# Patient Record
Sex: FEMALE | Race: WHITE | Employment: UNEMPLOYED | URBAN - METROPOLITAN AREA
[De-identification: names, ages, dates, MRNs, and addresses within clinical notes are randomized per-mention and may not be internally consistent; named-entity substitution may affect disease eponyms.]

---

## 2019-02-07 ENCOUNTER — DOCUMENTATION ONLY (OUTPATIENT)
Dept: NEUROSURGERY | Age: 64
End: 2019-02-07

## 2019-02-07 DIAGNOSIS — I67.1 CEREBRAL ANEURYSM: Primary | ICD-10-CM

## 2019-02-12 ENCOUNTER — TELEPHONE (OUTPATIENT)
Dept: NEUROSURGERY | Age: 64
End: 2019-02-12

## 2019-02-12 DIAGNOSIS — I67.1 CEREBRAL ANEURYSM: Primary | ICD-10-CM

## 2019-02-12 NOTE — TELEPHONE ENCOUNTER
Spoke to provider and VO for MRA with Contrast noted. Spoke to Gerber @ [facility] and informed her of new order.

## 2019-02-12 NOTE — TELEPHONE ENCOUNTER
----- Message from [facility] CNA sent at 2/11/2019 12:42 PM EST -----  Reji Joe [phone]- patient states the order is to be MRA brain with contrast -  needs clarification before scheduling

## 2019-02-27 ENCOUNTER — HOSPITAL ENCOUNTER (OUTPATIENT)
Dept: MRI IMAGING | Age: 64
Discharge: HOME | End: 2019-02-27
Attending: RADIOLOGY
Payer: MEDICARE

## 2019-02-27 VITALS — WEIGHT: 283 LBS | BODY MASS INDEX: 37.34 KG/M2

## 2019-02-27 DIAGNOSIS — I67.1 CEREBRAL ANEURYSM: ICD-10-CM

## 2019-02-27 PROCEDURE — A9585 GADOBUTROL INJECTION: HCPCS | Performed by: RADIOLOGY

## 2019-02-27 PROCEDURE — 74011250636 HC RX REV CODE- 250/636: Performed by: RADIOLOGY

## 2019-02-27 PROCEDURE — 74011000258 HC RX REV CODE- 258: Performed by: RADIOLOGY

## 2019-02-27 PROCEDURE — 70545 MR ANGIOGRAPHY HEAD W/DYE: CPT

## 2019-02-27 RX ADMIN — SODIUM CHLORIDE 100 ML: 900 INJECTION, SOLUTION INTRAVENOUS at 16:00

## 2019-02-27 RX ADMIN — GADOBUTROL 10 ML: 604.72 INJECTION INTRAVENOUS at 16:00

## 2019-09-25 ENCOUNTER — HOSPITAL ENCOUNTER (OUTPATIENT)
Dept: GENERAL RADIOLOGY | Age: 64
Discharge: HOME | End: 2019-09-25
Attending: INTERNAL MEDICINE
Payer: MEDICARE

## 2019-09-25 DIAGNOSIS — R05.9 COUGH: ICD-10-CM

## 2019-09-25 PROCEDURE — 71046 X-RAY EXAM CHEST 2 VIEWS: CPT

## 2020-05-15 DIAGNOSIS — I67.1 NONRUPTURED CEREBRAL ANEURYSM, INTERNAL CAROTID ARTERY: Primary | ICD-10-CM

## 2020-05-15 DIAGNOSIS — I67.1 CEREBRAL ANEURYSM: ICD-10-CM

## 2020-05-15 NOTE — PROGRESS NOTES
Provider spoke to patient and new orders received for MRI wo cont and MRA wwo GAVIN for evaluation of bilateral internal carotid artery aneurysm coiling. Patient is aware of orders from provider.

## 2020-05-18 ENCOUNTER — APPOINTMENT (OUTPATIENT)
Dept: MRI IMAGING | Age: 65
End: 2020-05-18
Attending: EMERGENCY MEDICINE
Payer: MEDICARE

## 2020-05-18 ENCOUNTER — HOSPITAL ENCOUNTER (EMERGENCY)
Age: 65
Discharge: HOME | End: 2020-05-18
Attending: EMERGENCY MEDICINE | Admitting: EMERGENCY MEDICINE
Payer: MEDICARE

## 2020-05-18 VITALS
OXYGEN SATURATION: 93 % | RESPIRATION RATE: 18 BRPM | DIASTOLIC BLOOD PRESSURE: 58 MMHG | HEART RATE: 89 BPM | TEMPERATURE: 98.4 F | SYSTOLIC BLOOD PRESSURE: 114 MMHG

## 2020-05-18 DIAGNOSIS — H53.2 DIPLOPIA: ICD-10-CM

## 2020-05-18 DIAGNOSIS — R51.9 NONINTRACTABLE HEADACHE, UNSPECIFIED CHRONICITY PATTERN, UNSPECIFIED HEADACHE TYPE: Primary | ICD-10-CM

## 2020-05-18 LAB
ALBUMIN SERPL-MCNC: 3.7 G/DL (ref 3.5–5)
ALBUMIN/GLOB SERPL: 0.9 {RATIO} (ref 1.1–2.2)
ALP SERPL-CCNC: 95 U/L (ref 45–117)
ALT SERPL-CCNC: 34 U/L (ref 12–78)
ANION GAP SERPL CALC-SCNC: 8 MMOL/L (ref 5–15)
AST SERPL-CCNC: 28 U/L (ref 15–37)
ATRIAL RATE: 85 BPM
BASOPHILS # BLD: 0 K/UL (ref 0–0.1)
BASOPHILS NFR BLD: 0 % (ref 0–1)
BILIRUB SERPL-MCNC: 0.5 MG/DL (ref 0.2–1)
BUN SERPL-MCNC: 23 MG/DL (ref 6–20)
BUN/CREAT SERPL: 28 (ref 12–20)
CALCIUM SERPL-MCNC: 9.4 MG/DL (ref 8.5–10.1)
CALCULATED P AXIS, ECG09: 61 DEGREES
CALCULATED R AXIS, ECG10: -58 DEGREES
CALCULATED T AXIS, ECG11: 35 DEGREES
CHLORIDE SERPL-SCNC: 108 MMOL/L (ref 97–108)
CO2 SERPL-SCNC: 25 MMOL/L (ref 21–32)
COMMENT, HOLDF: NORMAL
CREAT SERPL-MCNC: 0.82 MG/DL (ref 0.55–1.02)
DIAGNOSIS, 93000: NORMAL
DIFFERENTIAL METHOD BLD: ABNORMAL
EOSINOPHIL # BLD: 0.1 K/UL (ref 0–0.4)
EOSINOPHIL NFR BLD: 2 % (ref 0–7)
ERYTHROCYTE [DISTWIDTH] IN BLOOD BY AUTOMATED COUNT: 13.3 % (ref 11.5–14.5)
GLOBULIN SER CALC-MCNC: 4.2 G/DL (ref 2–4)
GLUCOSE SERPL-MCNC: 86 MG/DL (ref 65–100)
HCT VFR BLD AUTO: 41.7 % (ref 35–47)
HGB BLD-MCNC: 13.6 G/DL (ref 11.5–16)
IMM GRANULOCYTES # BLD AUTO: 0 K/UL (ref 0–0.04)
IMM GRANULOCYTES NFR BLD AUTO: 0 % (ref 0–0.5)
INR PPP: 2.1 (ref 0.9–1.1)
LYMPHOCYTES # BLD: 1.8 K/UL (ref 0.8–3.5)
LYMPHOCYTES NFR BLD: 28 % (ref 12–49)
MCH RBC QN AUTO: 31.2 PG (ref 26–34)
MCHC RBC AUTO-ENTMCNC: 32.6 G/DL (ref 30–36.5)
MCV RBC AUTO: 95.6 FL (ref 80–99)
MONOCYTES # BLD: 0.6 K/UL (ref 0–1)
MONOCYTES NFR BLD: 9 % (ref 5–13)
NEUTS SEG # BLD: 3.9 K/UL (ref 1.8–8)
NEUTS SEG NFR BLD: 61 % (ref 32–75)
NRBC # BLD: 0 K/UL (ref 0–0.01)
NRBC BLD-RTO: 0 PER 100 WBC
P-R INTERVAL, ECG05: 198 MS
PLATELET # BLD AUTO: 207 K/UL (ref 150–400)
PMV BLD AUTO: 8.8 FL (ref 8.9–12.9)
POTASSIUM SERPL-SCNC: 3.6 MMOL/L (ref 3.5–5.1)
PROT SERPL-MCNC: 7.9 G/DL (ref 6.4–8.2)
PROTHROMBIN TIME: 20.7 SEC (ref 9–11.1)
Q-T INTERVAL, ECG07: 372 MS
QRS DURATION, ECG06: 94 MS
QTC CALCULATION (BEZET), ECG08: 442 MS
RBC # BLD AUTO: 4.36 M/UL (ref 3.8–5.2)
SAMPLES BEING HELD,HOLD: NORMAL
SODIUM SERPL-SCNC: 141 MMOL/L (ref 136–145)
VENTRICULAR RATE, ECG03: 85 BPM
WBC # BLD AUTO: 6.3 K/UL (ref 3.6–11)

## 2020-05-18 PROCEDURE — 96375 TX/PRO/DX INJ NEW DRUG ADDON: CPT

## 2020-05-18 PROCEDURE — 74011250636 HC RX REV CODE- 250/636: Performed by: EMERGENCY MEDICINE

## 2020-05-18 PROCEDURE — 85610 PROTHROMBIN TIME: CPT

## 2020-05-18 PROCEDURE — 74011000258 HC RX REV CODE- 258: Performed by: EMERGENCY MEDICINE

## 2020-05-18 PROCEDURE — 93005 ELECTROCARDIOGRAM TRACING: CPT

## 2020-05-18 PROCEDURE — 99285 EMERGENCY DEPT VISIT HI MDM: CPT

## 2020-05-18 PROCEDURE — 70551 MRI BRAIN STEM W/O DYE: CPT

## 2020-05-18 PROCEDURE — 96374 THER/PROPH/DIAG INJ IV PUSH: CPT

## 2020-05-18 PROCEDURE — 85025 COMPLETE CBC W/AUTO DIFF WBC: CPT

## 2020-05-18 PROCEDURE — 77030021566 MRA BRAIN W WO CONT

## 2020-05-18 PROCEDURE — 36415 COLL VENOUS BLD VENIPUNCTURE: CPT

## 2020-05-18 PROCEDURE — 74011250637 HC RX REV CODE- 250/637: Performed by: EMERGENCY MEDICINE

## 2020-05-18 PROCEDURE — A9585 GADOBUTROL INJECTION: HCPCS | Performed by: EMERGENCY MEDICINE

## 2020-05-18 PROCEDURE — 80053 COMPREHEN METABOLIC PANEL: CPT

## 2020-05-18 RX ORDER — METOCLOPRAMIDE HYDROCHLORIDE 5 MG/ML
10 INJECTION INTRAMUSCULAR; INTRAVENOUS ONCE
Status: COMPLETED | OUTPATIENT
Start: 2020-05-18 | End: 2020-05-18

## 2020-05-18 RX ORDER — DEXAMETHASONE SODIUM PHOSPHATE 10 MG/ML
6 INJECTION INTRAMUSCULAR; INTRAVENOUS ONCE
Status: COMPLETED | OUTPATIENT
Start: 2020-05-18 | End: 2020-05-18

## 2020-05-18 RX ORDER — ACETAMINOPHEN 500 MG
1000 TABLET ORAL
Status: COMPLETED | OUTPATIENT
Start: 2020-05-18 | End: 2020-05-18

## 2020-05-18 RX ORDER — LORAZEPAM 2 MG/ML
1 INJECTION INTRAMUSCULAR ONCE
Status: COMPLETED | OUTPATIENT
Start: 2020-05-18 | End: 2020-05-18

## 2020-05-18 RX ADMIN — ACETAMINOPHEN 1000 MG: 500 TABLET, FILM COATED ORAL at 16:54

## 2020-05-18 RX ADMIN — DEXAMETHASONE SODIUM PHOSPHATE 6 MG: 10 INJECTION, SOLUTION INTRAMUSCULAR; INTRAVENOUS at 17:18

## 2020-05-18 RX ADMIN — LORAZEPAM 1 MG: 2 INJECTION INTRAMUSCULAR; INTRAVENOUS at 14:58

## 2020-05-18 RX ADMIN — SODIUM CHLORIDE 100 ML: 900 INJECTION, SOLUTION INTRAVENOUS at 16:04

## 2020-05-18 RX ADMIN — METOCLOPRAMIDE 10 MG: 5 INJECTION, SOLUTION INTRAMUSCULAR; INTRAVENOUS at 17:21

## 2020-05-18 RX ADMIN — GADOBUTROL 13 ML: 604.72 INJECTION INTRAVENOUS at 16:04

## 2020-05-18 NOTE — ED NOTES
Patient reports headache and double vision since 5/13. Patient reports LLE weaker at baseline, noticed slight weakness to the RUE.

## 2020-05-18 NOTE — ED PROVIDER NOTES
HPI the patient has a history of multiple cerebral aneurysms, and has had 2 of them coiled. She presents with a 5-day history of diplopia and occipital headache. She denies slurred speech, focal neuro weakness, ataxia, vomiting or other complaints. History reviewed. No pertinent past medical history. History reviewed. No pertinent surgical history. History reviewed. No pertinent family history. Social History     Socioeconomic History    Marital status:      Spouse name: Not on file    Number of children: Not on file    Years of education: Not on file    Highest education level: Not on file   Occupational History    Not on file   Social Needs    Financial resource strain: Not on file    Food insecurity     Worry: Not on file     Inability: Not on file    Transportation needs     Medical: Not on file     Non-medical: Not on file   Tobacco Use    Smoking status: Never Smoker   Substance and Sexual Activity    Alcohol use: Not on file    Drug use: Not on file    Sexual activity: Not on file   Lifestyle    Physical activity     Days per week: Not on file     Minutes per session: Not on file    Stress: Not on file   Relationships    Social connections     Talks on phone: Not on file     Gets together: Not on file     Attends Hindu service: Not on file     Active member of club or organization: Not on file     Attends meetings of clubs or organizations: Not on file     Relationship status: Not on file    Intimate partner violence     Fear of current or ex partner: Not on file     Emotionally abused: Not on file     Physically abused: Not on file     Forced sexual activity: Not on file   Other Topics Concern    Not on file   Social History Narrative    Not on file         ALLERGIES: Codeine; Diphenhydramine; and Morphine    Review of Systems   Constitutional: Negative for fever. HENT: Negative for voice change. Eyes: Positive for visual disturbance. Negative for pain. Respiratory: Negative for cough and shortness of breath. Cardiovascular: Negative for chest pain. Gastrointestinal: Negative for abdominal pain, nausea and vomiting. Genitourinary: Negative for flank pain. Musculoskeletal: Negative for back pain. Skin: Negative for rash. Neurological: Positive for headaches. Psychiatric/Behavioral: Negative for confusion. Vitals:    05/18/20 1400 05/18/20 1445 05/18/20 1500 05/18/20 1639   BP: 158/68 148/88 (!) 148/91 (!) 143/97   Pulse: 83 84 81 89   Resp: 16 17 16 18   Temp:       SpO2: 95% 96% 97% 95%            Physical Exam  Constitutional:       General: She is not in acute distress. Appearance: She is well-developed. HENT:      Head: Normocephalic. Eyes:      Extraocular Movements: Extraocular movements intact. Pupils: Pupils are equal, round, and reactive to light. Neck:      Musculoskeletal: Normal range of motion. Cardiovascular:      Rate and Rhythm: Normal rate. Heart sounds: No murmur. Pulmonary:      Effort: Pulmonary effort is normal.      Breath sounds: Normal breath sounds. Abdominal:      Palpations: Abdomen is soft. Tenderness: There is no abdominal tenderness. Musculoskeletal: Normal range of motion. Skin:     General: Skin is warm and dry. Capillary Refill: Capillary refill takes less than 2 seconds. Neurological:      General: No focal deficit present. Mental Status: She is alert and oriented to person, place, and time. Cranial Nerves: No cranial nerve deficit. Psychiatric:         Behavior: Behavior normal.          MDM       Procedures      [facility] and he requests an MRI and MRA. After those were done he reviewed the films and says there is no change in any of the aneurysms and coils. He recommends a migraine cocktail here in the ED and discharged with referral to ophthalmology.

## 2020-05-18 NOTE — ED NOTES
Discharge instructions reviewed with patient. Circumstances to return to ED and follow up reviewed with patient. Patient verbalized understanding. Patient AAO x4, ambulatory with walker to waiting room. Patient stable and safe for discharge.

## 2020-05-18 NOTE — ED TRIAGE NOTES
Patient presents from home with complaints of posterior head pain, dizziness, and double vision since last Wednesday the 13th. Patient was seen at [facility] and admitted for this issue last week. Patient reports she has had \"6 brain anyurismisms for 20 years\".   Patient reports her symptoms became worse over the weekend and have not improved

## 2020-05-18 NOTE — DISCHARGE INSTRUCTIONS

## 2020-05-19 ENCOUNTER — PATIENT OUTREACH (OUTPATIENT)
Dept: CASE MANAGEMENT | Age: 65
End: 2020-05-19

## 2020-05-19 NOTE — PROGRESS NOTES
ACM attempted to reach patient for ER follow up .  Left VM with request for return call with contact infor provided

## 2020-05-20 ENCOUNTER — PATIENT OUTREACH (OUTPATIENT)
Dept: CASE MANAGEMENT | Age: 65
End: 2020-05-20

## 2020-05-20 NOTE — PROGRESS NOTES
Patient contacted regarding recent discharge and COVID-19 risk   Care Transition Nurse/ Ambulatory Care Manager contacted the patient by telephone to perform post discharge assessment. Verified name and  with patient as identifiers. Patient has following risk factors of: no known risk factors. CTN/ACM reviewed discharge instructions, medical action plan and red flags related to discharge diagnosis. Reviewed and educated them on any new and changed medications related to discharge diagnosis. Advised obtaining a 90-day supply of all daily and as-needed medications. Education provided regarding infection prevention, and signs and symptoms of COVID-19 and when to seek medical attention with patient who verbalized understanding. Discussed exposure protocols and quarantine from [address] you at higher risk for severe illness  and given an opportunity for questions and concerns. The patient agrees to contact the COVID-19 hotline [phone] or PCP office for questions related to their healthcare. CTN/ACM provided contact information for future reference. From CDC: Are you at higher risk for severe illness?  Wash your hands often.  Avoid close contact (6 feet, which is about two arm lengths) with people who are sick.  Put distance between yourself and other people if COVID-19 is spreading in your community.  Clean and disinfect frequently touched surfaces.  Avoid all cruise travel and non-essential air travel.  Call your healthcare professional if you have concerns about COVID-19 and your underlying condition or if you are sick. For more information on steps you can take to protect yourself, see CDC's How to Protect Yourself      Patient/family/caregiver given information for [facility] and agrees to enroll no    Plan for follow-up call in 7-14 days based on severity of symptoms and risk factors. Patient reports she still has the same dizziness, double vision, headache.  She saw PCP Dr Vianney Tarango yesterday. She also saw the opthamologist who thought her symptoms may be due to myasthenia gravis so she is being worked up for this. Patient reports she does live alone but is managing ADL's. [employer] teaching done.  Questions answered

## 2020-05-22 ENCOUNTER — OFFICE VISIT (OUTPATIENT)
Dept: NEUROSURGERY | Age: 65
End: 2020-05-22

## 2020-05-22 VITALS
SYSTOLIC BLOOD PRESSURE: 138 MMHG | HEART RATE: 98 BPM | HEIGHT: 70 IN | OXYGEN SATURATION: 96 % | BODY MASS INDEX: 41.95 KG/M2 | WEIGHT: 293 LBS | DIASTOLIC BLOOD PRESSURE: 92 MMHG | TEMPERATURE: 96.4 F

## 2020-05-22 DIAGNOSIS — I67.1 CEREBRAL ANEURYSM: Primary | ICD-10-CM

## 2020-05-22 DIAGNOSIS — E66.01 OBESITY, MORBID (HCC): ICD-10-CM

## 2020-05-22 RX ORDER — WARFARIN SODIUM 5 MG/1
5 TABLET ORAL DAILY
COMMUNITY
Start: 2020-04-06

## 2020-05-22 NOTE — PROGRESS NOTES
New patient referred by [facility]. Patient in today for her 3 year surveillance and imaging review. Patient is known to practice. Patient reports double vision and sever headache in the back of her head since 5/13/2020. Patient went to [facility] ED and was transferred to [facility] ED on 5/13/2020. Released the next day. Told she may have a brain stem migraine. Reports double vision and headache has resolved yesterday. Denies blurred or double vision, headache, dizziness, sensitivity to light. Reports unsteady gait and ambulates with Rolator. No acute problems reported.

## 2020-06-03 ENCOUNTER — PATIENT OUTREACH (OUTPATIENT)
Dept: FAMILY MEDICINE CLINIC | Age: 65
End: 2020-06-03

## 2020-06-03 NOTE — PROGRESS NOTES
Patient resolved from Transition of Care episode on 6/3/20  Discussed COVID-19 related testing which was not done at this time. Test results were not done. Patient informed of results, if available? no     Patient/family has been provided the following resources and education related to COVID-19:                         Signs, symptoms and red flags related to COVID-19            CDC exposure and quarantine guidelines            [facility] exposure contact - [phone]            Contact for their local Department of Health                 Patient currently reports that the following symptoms have improved:  no new symptoms. No further outreach scheduled with this CTN/ACM/LPN/HC/ MA. Episode of Care resolved. Patient has this CTN/ACM/LPN/HC/MA contact information if future needs arise.

## 2020-06-28 NOTE — PROGRESS NOTES
Neurointerventional Surgery  Ambulatory Progress Note      Patient: Mariah Vega MRN: [id number]  SSN: [id number]    YOB: 1955  Age: 59 y.o. Sex: female      History of Present Illness:     Ms Andrew Gipson is a 58 yo female well known to our service and has multiple aneurysms for which she underwent endovascular coil embolization of a right internal carotid artery aneurysm in 2006 and a left internal carotid artery aneurysm in 2007. She has two other small left internal carotid artery aneurysms that we are serially imaging (left paraclinoid ICA aneurysms). Follow up MRAs over the past 14 years have shown no change in the tiny left paraclinoid aneurysms, no residual left paraclinoid aneurysm and a stable, 3 mm neck remnant on the right paraclinoid aneurysm. She is currently being followed with surveillance MRAs on an every three year basis. She is here today to receive her most recent MRA results. Patient reports double vision and sever headache in the back of her head since 5/13/2020. Patient went to [facility] ED on 5/14/20. We do not have copies of the records, but the patient had a CT/A of the head performed. I personally reviewed the imaging, and there was no evidence of subarachnoid hemorrahge, intracranial thrombus or atherosclerotic vascular disease. She was discharged and returned to [facility] ED on 5/18/2020. ED notes state pt presented with a 5-day history of diplopia and occipital headache. She denied slurred speech, focal neuro weakness, ataxia, vomiting or other complaints. ED spoke to Dr. Kassandra Clarke who ordered and MRI and MRA. He noted no change in any of the unsecured aneurysms or coiled aneurysms. He recommended a migraine cocktail and the patient was discharged with referral to ophthalmology. Pt states she was told that she may have had a brainstem migraine. Today she reports that the diplopia and headache  resolved yesterday.  Denies dizziness, nausea, vomiting, sensitivity to light, neck pain, or focal neurological deficit. She has an unsteady gait and is ambulating with a Rolator. No acute problems reported. At Ms. Robertson's last visit in 2016, she was noted to have chronic headaches, and often feels dizzy and unbalanced. She has also had two episodes of wyncope in the past, one resulting in a right humeral fracture. She has been evaluated per cardiology and cardiac cath normal.  She has not had any recent syncopal spells.     PMH:  Ms. Michele Yates suffered a subacute ischemic stroke involving the [facility] territory in 2015 and was hospitalized at [facility]. With this episode, she had mild to moderate deficits of dysarthria and left hemiparesis. According to record from [facility], an MRA showed occlusion of the distal M1 at the trifurcation. Imaging from that study is not available for review, but an MRA performed in 2016 showed no right carotid bifurcation disease, and no evidence of right MCA stenosis or occlusion.              Patient Active Problem List   Diagnosis Code    Nonruptured cerebral aneurysm, internal carotid artery        Review of Systems    A comprehensive ROS was performed and was negative except for as per HPI. Objective:     Current Outpatient Medications   Medication Sig Dispense Refill    warfarin (COUMADIN) 5 mg tablet Take 5 mg by mouth daily.  cholecalciferol, vitamin D3, (VITAMIN D3 PO) Take 2,000 Units by mouth daily.  celecoxib (CELEBREX) 100 mg capsule Take  by mouth two (2) times a day.  omeprazole (PRILOSEC) 40 mg capsule Take 40 mg by mouth daily.  FLUoxetine (PROZAC) 40 mg capsule Take 40 mg by mouth daily.  Zolpidem 10 mg subl by SubLINGual route.           Visit Vitals  BP (!) 138/92 (BP 1 Location: Left arm)   Pulse 98   Temp (!) 96.4 °F (35.8 °C)   Ht 5' 10\" (1.778 m)   Wt 295 lb (133.8 kg)   SpO2 96%   BMI 42.33 kg/m²       Allergies   Allergen Reactions    Codeine Rash    Diphenhydramine Other (comments)     Makes her sleep too much    Levaquin [Levofloxacin] Rash    Morphine Other (comments)     Hallucinations       Current Outpatient Medications   Medication Sig    warfarin (COUMADIN) 5 mg tablet Take 5 mg by mouth daily.  cholecalciferol, vitamin D3, (VITAMIN D3 PO) Take 2,000 Units by mouth daily.  celecoxib (CELEBREX) 100 mg capsule Take  by mouth two (2) times a day.  omeprazole (PRILOSEC) 40 mg capsule Take 40 mg by mouth daily.  FLUoxetine (PROZAC) 40 mg capsule Take 40 mg by mouth daily.  Zolpidem 10 mg subl by SubLINGual route. No current facility-administered medications for this visit. Physical Exam:  General: NAD  Eyes: conjunctivae/corneas clear. PERRL, EOM's intact  Lungs: non-labored breathing on room air  Heart: RRR  Extremities: extremities normal, atraumatic, no cyanosis or edema  Pulses: 2+ and symmetric     Neurologic Exam:  Mental Status:             Alert and oriented x 4. Appropriate affect, mood and behavior.        Language:                   Normal fluency, repetition, comprehension and naming.     Cranial Nerves:           Pupils equal, round and reactive to light. Visual fields full to confrontation. Extraocular movements intact. Facial sensation intact V1 - V3. Full facial strength, no asymmetry. Hearing intact bilaterally. No dysarthria. Tongue protrudes to midline, palate elevates symmetrically. Shoulder shrug 5/5 bilaterally.     Motor:                                                                Bulk and tone normal.                                       5/5 power in all extremities proximally and distally.                                       No involuntary movements.     Sensation:                   Sensation intact throughout to light touch.     Coordination & Gait:   Negative Rombergs; slow gait with Rollator, unsteady with turning        Labs:  Lab Results   Component Value Date/Time    Sodium 141 05/18/2020 12:30 PM    Potassium 3.6 05/18/2020 12:30 PM    Chloride 108 05/18/2020 12:30 PM    CO2 25 05/18/2020 12:30 PM    Anion gap 8 05/18/2020 12:30 PM    Glucose 86 05/18/2020 12:30 PM    BUN 23 (H) 05/18/2020 12:30 PM    Creatinine 0.82 05/18/2020 12:30 PM    BUN/Creatinine ratio 28 (H) 05/18/2020 12:30 PM    GFR est AA >60 05/18/2020 12:30 PM    GFR est non-AA >60 05/18/2020 12:30 PM    Calcium 9.4 05/18/2020 12:30 PM     Lab Results   Component Value Date/Time    WBC 6.3 05/18/2020 12:30 PM    HGB 13.6 05/18/2020 12:30 PM    HCT 41.7 05/18/2020 12:30 PM    PLATELET 314 17/53/0759 12:30 PM    MCV 95.6 05/18/2020 12:30 PM     Lab Results   Component Value Date/Time    MCH 31.2 05/18/2020 12:30 PM    MCHC 32.6 05/18/2020 12:30 PM    BASOPHILS 0 05/18/2020 12:30 PM    ABS. LYMPHOCYTES 1.8 05/18/2020 12:30 PM    ABS. MONOCYTES 0.6 05/18/2020 12:30 PM    ABS. EOSINOPHILS 0.1 05/18/2020 12:30 PM    ABS. BASOPHILS 0.0 05/18/2020 12:30 PM       IMAGING:    I personally reviewed the patient's most recent imaging and compared it to previous studies. Mra Brain W Wo Cont    Result Date: 5/18/2020  IMPRESSION: 1. No acute intracranial abnormality. 2. Previous coil embolization right supraclinoid internal carotid artery aneurysm with small area of residual flow at the neck. 3. Prior left supraclinoid internal carotid artery embolization with no residual filling of the aneurysm sac. 4. Unchanged focal fusiform dilatation left cervical internal carotid artery at the skull base    Mri Brain Wo Cont    Result Date: 5/18/2020  IMPRESSION: 1. No acute intracranial abnormality.  2. Previous coil embolization right supraclinoid internal carotid artery aneurysm with small area of residual flow at the neck. 3. Prior left supraclinoid internal carotid artery embolization with no residual filling of the aneurysm sac. 4. Unchanged focal fusiform dilatation left cervical internal carotid artery at the skull base       Assessment/Plan:          ICD-10-CM ICD-9-CM     1. Nonruptured cerebral aneurysm, internal carotid artery I67.1 437.3 MRA BRAIN W CONT      Ms. Magdalena Briseno is currently not experiencing any symptoms following her most recent ED visit.      We discussed the following:    Her unsecured aneurysms are unchanged, and the secured aneurysms show no change in the coiled left aneurysm and stability of the neck remnant on the coiled right aneurysm. We also discussed   - best medical therapy for stroke prevention utilized and should be continued for life   - signs and sx of stroke discussed and the importance of the activation of EMS, patient verbalized understanding    I am uncertain as to whether or not the headache and dizziness she experienced was a brainstem migraine, but the symptoms resolved with a migraine cocktail. If the symptoms were to re-occur, I would have her see one of our neurologists. She should also make an ophthalmology appointment as per Dr. Yani Spann recommendations. At this time, we will see her back in three years for aneurysm surveillance MRA.         I have personally seen and examined the patient. I have personally reviewed the chart and images. Elements of my examination included history of present illness, review of systems, review of past medical and surgical history, review of medications, and physical and neurological examination. I have personally reviewed the findings and impressions with the patient. I have discussed the diagnosis with the patient and the intended plan as seen in the above orders. Patient is in agreement. The patient has received an after-visit summary and questions were answered concerning future plans.  I have discussed medication side effects and warnings with the patient as well. Floyd Roy M.D.   Conner Rae  Professor, Radiology, Neurology and Neurological Surgery  [facility]

## 2021-04-08 ENCOUNTER — TRANSCRIBE ORDER (OUTPATIENT)
Dept: SCHEDULING | Age: 66
End: 2021-04-08

## 2021-04-08 DIAGNOSIS — Z12.31 SCREENING MAMMOGRAM FOR HIGH-RISK PATIENT: Primary | ICD-10-CM

## 2021-04-14 ENCOUNTER — HOSPITAL ENCOUNTER (OUTPATIENT)
Dept: MAMMOGRAPHY | Age: 66
Discharge: HOME | End: 2021-04-14
Attending: FAMILY MEDICINE
Payer: MEDICARE

## 2021-04-14 ENCOUNTER — TRANSCRIBE ORDER (OUTPATIENT)
Dept: GENERAL RADIOLOGY | Age: 66
End: 2021-04-14

## 2021-04-14 DIAGNOSIS — Z12.31 SCREENING MAMMOGRAM, ENCOUNTER FOR: ICD-10-CM

## 2021-04-14 DIAGNOSIS — Z12.31 SCREENING MAMMOGRAM, ENCOUNTER FOR: Primary | ICD-10-CM

## 2021-04-14 DIAGNOSIS — Z12.31 SCREENING MAMMOGRAM FOR HIGH-RISK PATIENT: ICD-10-CM

## 2021-04-14 PROCEDURE — 77063 BREAST TOMOSYNTHESIS BI: CPT

## 2022-04-01 ENCOUNTER — TRANSCRIBE ORDER (OUTPATIENT)
Dept: SCHEDULING | Age: 67
End: 2022-04-01

## 2022-04-01 DIAGNOSIS — Z12.31 OTHER SCREENING MAMMOGRAM: Primary | ICD-10-CM

## 2022-05-13 ENCOUNTER — HOSPITAL ENCOUNTER (OUTPATIENT)
Dept: MAMMOGRAPHY | Age: 67
Discharge: HOME | End: 2022-05-13
Attending: FAMILY MEDICINE
Payer: MEDICARE

## 2022-05-13 DIAGNOSIS — Z12.31 OTHER SCREENING MAMMOGRAM: ICD-10-CM

## 2022-05-13 PROCEDURE — 77063 BREAST TOMOSYNTHESIS BI: CPT

## 2023-04-25 ENCOUNTER — TRANSCRIBE ORDER (OUTPATIENT)
Dept: SCHEDULING | Age: 68
End: 2023-04-25

## 2023-04-25 ENCOUNTER — TRANSCRIBE ORDERS (OUTPATIENT)
Facility: HOSPITAL | Age: 68
End: 2023-04-25

## 2023-04-25 DIAGNOSIS — Z12.31 VISIT FOR SCREENING MAMMOGRAM: Primary | ICD-10-CM

## 2023-05-18 ENCOUNTER — HOSPITAL ENCOUNTER (OUTPATIENT)
Age: 68
Discharge: HOME | End: 2023-05-18
Payer: MEDICARE

## 2023-05-18 DIAGNOSIS — Z12.31 VISIT FOR SCREENING MAMMOGRAM: ICD-10-CM

## 2023-05-18 PROCEDURE — 77063 BREAST TOMOSYNTHESIS BI: CPT

## 2023-12-16 NOTE — PATIENT INSTRUCTIONS
A Healthy Lifestyle: Care Instructions Your Care Instructions A healthy lifestyle can help you feel good, stay at a healthy weight, and have plenty of energy for both work and play. A healthy lifestyle is something you can share with your whole family. A healthy lifestyle also can lower your risk for serious health problems, such as high blood pressure, heart disease, and diabetes. You can follow a few steps listed below to improve your health and the health of your family. Follow-up care is a key part of your treatment and safety. Be sure to make and go to all appointments, and call your doctor if you are having problems. It's also a good idea to know your test results and keep a list of the medicines you take. How can you care for yourself at home? · Do not eat too much sugar, fat, or fast foods. You can still have dessert and treats now and then. The goal is moderation. · Start small to improve your eating habits. Pay attention to portion sizes, drink less juice and soda pop, and eat more fruits and vegetables. ? Eat a healthy amount of food. A 3-ounce serving of meat, for example, is about the size of a deck of cards. Fill the rest of your plate with vegetables and whole grains. ? Limit the amount of soda and sports drinks you have every day. Drink more water when you are thirsty. ? Eat at least 5 servings of fruits and vegetables every day. It may seem like a lot, but it is not hard to reach this goal. A serving or helping is 1 piece of fruit, 1 cup of vegetables, or 2 cups of leafy, raw vegetables. Have an apple or some carrot sticks as an afternoon snack instead of a candy bar. Try to have fruits and/or vegetables at every meal. 
· Make exercise part of your daily routine. You may want to start with simple activities, such as walking, bicycling, or slow swimming. Try to be active 30 to 60 minutes every day.  You do not need to do all 30 to 60 Continue home hydrocortisone 20mg a.m., 15 mg p.m., fludrocortisone 0.2 mg daily   Outpatient endocrinology follow-up   minutes all at once. For example, you can exercise 3 times a day for 10 or 20 minutes. Moderate exercise is safe for most people, but it is always a good idea to talk to your doctor before starting an exercise program. 
· Keep moving. Adeline Rubioi the lawn, work in the garden, or Scaffold. Take the stairs instead of the elevator at work. · If you smoke, quit. People who smoke have an increased risk for heart attack, stroke, cancer, and other lung illnesses. Quitting is hard, but there are ways to boost your chance of quitting tobacco for good. ? Use nicotine gum, patches, or lozenges. ? Ask your doctor about stop-smoking programs and medicines. ? Keep trying. In addition to reducing your risk of diseases in the future, you will notice some benefits soon after you stop using tobacco. If you have shortness of breath or asthma symptoms, they will likely get better within a few weeks after you quit. · Limit how much alcohol you drink. Moderate amounts of alcohol (up to 2 drinks a day for men, 1 drink a day for women) are okay. But drinking too much can lead to liver problems, high blood pressure, and other health problems. Family health If you have a family, there are many things you can do together to improve your health. · Eat meals together as a family as often as possible. · Eat healthy foods. This includes fruits, vegetables, lean meats and dairy, and whole grains. · Include your family in your fitness plan. Most people think of activities such as jogging or tennis as the way to fitness, but there are many ways you and your family can be more active. Anything that makes you breathe hard and gets your heart pumping is exercise. Here are some tips: 
? Walk to do errands or to take your child to school or the bus. 
? Go for a family bike ride after dinner instead of watching TV. Where can you learn more? Go to http://skyler-alfreda.info/ Enter G276 in the search box to learn more about \"A Healthy Lifestyle: Care Instructions. \" Current as of: May 28, 2019Content Version: 12.4 © 3617-2824 [employer]. Care instructions adapted under license by [employer] (which disclaims liability or warranty for this information). If you have questions about a medical condition or this instruction, always ask your healthcare professional. [place] [address] any warranty or liability for your use of this information.

## 2024-04-29 ENCOUNTER — TRANSCRIBE ORDERS (OUTPATIENT)
Facility: HOSPITAL | Age: 69
End: 2024-04-29

## 2024-04-29 DIAGNOSIS — Z12.31 VISIT FOR SCREENING MAMMOGRAM: Primary | ICD-10-CM

## 2024-05-24 ENCOUNTER — HOSPITAL ENCOUNTER (OUTPATIENT)
Age: 69
End: 2024-05-24
Payer: MEDICARE

## 2024-05-24 VITALS — BODY MASS INDEX: 41.95 KG/M2 | WEIGHT: 293 LBS | HEIGHT: 70 IN

## 2024-05-24 DIAGNOSIS — Z12.31 VISIT FOR SCREENING MAMMOGRAM: ICD-10-CM

## 2024-05-24 PROCEDURE — 77063 BREAST TOMOSYNTHESIS BI: CPT

## 2025-05-27 ENCOUNTER — HOSPITAL ENCOUNTER (OUTPATIENT)
Age: 70
Discharge: HOME | End: 2025-05-30
Payer: MEDICARE

## 2025-05-27 VITALS — WEIGHT: 293 LBS | HEIGHT: 70 IN | BODY MASS INDEX: 41.95 KG/M2

## 2025-05-27 DIAGNOSIS — Z12.31 VISIT FOR SCREENING MAMMOGRAM: ICD-10-CM

## 2025-05-27 PROCEDURE — 77063 BREAST TOMOSYNTHESIS BI: CPT

## 2025-07-08 ENCOUNTER — HOSPITAL ENCOUNTER (OUTPATIENT)
Facility: HOSPITAL | Age: 70
Discharge: HOME | End: 2025-07-11
Payer: MEDICARE

## 2025-07-08 DIAGNOSIS — R92.8 ABNORMAL MAMMOGRAM: ICD-10-CM

## 2025-07-08 PROCEDURE — 77065 DX MAMMO INCL CAD UNI: CPT

## 2025-07-08 PROCEDURE — 76642 ULTRASOUND BREAST LIMITED: CPT

## 2025-07-23 ENCOUNTER — HOSPITAL ENCOUNTER (OUTPATIENT)
Facility: HOSPITAL | Age: 70
Discharge: HOME | End: 2025-07-26
Payer: MEDICARE

## 2025-07-23 DIAGNOSIS — R92.8 ABNORMAL MAMMOGRAM: ICD-10-CM

## 2025-07-23 PROCEDURE — 6360000002 HC RX W HCPCS: Performed by: RADIOLOGY

## 2025-07-23 RX ORDER — LIDOCAINE HYDROCHLORIDE 10 MG/ML
5 INJECTION, SOLUTION INFILTRATION; PERINEURAL ONCE
Status: COMPLETED | OUTPATIENT
Start: 2025-07-23 | End: 2025-07-23

## 2025-07-23 RX ORDER — LIDOCAINE HYDROCHLORIDE AND EPINEPHRINE 10; 10 MG/ML; UG/ML
10 INJECTION, SOLUTION INFILTRATION; PERINEURAL ONCE
Status: COMPLETED | OUTPATIENT
Start: 2025-07-23 | End: 2025-07-23

## 2025-07-23 RX ADMIN — LIDOCAINE HYDROCHLORIDE 5 ML: 10 INJECTION, SOLUTION INFILTRATION; PERINEURAL at 09:45

## 2025-07-23 RX ADMIN — LIDOCAINE HYDROCHLORIDE,EPINEPHRINE BITARTRATE 10 ML: 10; .01 INJECTION, SOLUTION INFILTRATION; PERINEURAL at 09:45

## 2025-07-23 NOTE — PROGRESS NOTES
Patient tolerated right breast cyst aspiration well with scant bleeding. Aspiration site was covered with a band-aid and patient was instructed to keep the area clean and dry for at least 24 hours. She was provided with our phone number and encouraged to call with any questions or concerns.